# Patient Record
(demographics unavailable — no encounter records)

---

## 2024-11-19 NOTE — ASSESSMENT
[FreeTextEntry1] : c/o increasing migraines in the last month had a concussion earlier this year headaches have improved over the last week and is more on and off now will trial supatri[ptan will follow if no improvement rec to see neuro again    also complains of cold like symptoms postnasal drip yellow mucous - cough med ordered

## 2024-11-19 NOTE — REVIEW OF SYSTEMS
[Postnasal Drip] : postnasal drip [Nasal Discharge] : nasal discharge [Cough] : cough [Headache] : headache [Negative] : Heme/Lymph

## 2024-11-19 NOTE — HISTORY OF PRESENT ILLNESS
[FreeTextEntry8] : c/o increasing migraines in the last month had a concussion earlier this year headaches have improved over the last week and is more on and off now   also complains of cold like symptoms postnasal drip yellow mucous